# Patient Record
Sex: FEMALE | Employment: FULL TIME | URBAN - METROPOLITAN AREA
[De-identification: names, ages, dates, MRNs, and addresses within clinical notes are randomized per-mention and may not be internally consistent; named-entity substitution may affect disease eponyms.]

---

## 2017-10-18 ENCOUNTER — OFFICE VISIT (OUTPATIENT)
Dept: BEHAVIORAL/MENTAL HEALTH | Age: 42
End: 2017-10-18

## 2017-10-18 DIAGNOSIS — F43.23 ADJUSTMENT DISORDER WITH MIXED ANXIETY AND DEPRESSED MOOD: Primary | ICD-10-CM

## 2017-10-18 PROCEDURE — 90837 PSYTX W PT 60 MINUTES: CPT | Performed by: PSYCHOLOGIST

## 2017-10-18 NOTE — PROGRESS NOTES
Subjective:     Clive Perkins is a 46 y.o. female who presents with:  Chief Complaint   Patient presents with   Hodan Kenyoner is a 42-year-old female in works in the [employer] is experiencing some anxiety over relationship with a boyfriend who is involved with other people and may also be involved with drugs to leave her company lost a contract with [employer] and so she is with a new company that she got her position back and she is doing fine as far as work goes will plan on seeing the boyfriend and her together in couple's counseling    Assessment:     1. Adjustment disorder with mixed anxiety and depressed mood         Plan:      Plan is to see Edwina Juan either individually or in couple counseling about 2 weeks or 3 weeks    Return in about 2 weeks (around 11/1/2017).